# Patient Record
Sex: FEMALE
[De-identification: names, ages, dates, MRNs, and addresses within clinical notes are randomized per-mention and may not be internally consistent; named-entity substitution may affect disease eponyms.]

---

## 2018-01-08 ENCOUNTER — RESULT REVIEW (OUTPATIENT)
Age: 38
End: 2018-01-08

## 2022-10-19 PROBLEM — Z00.00 ENCOUNTER FOR PREVENTIVE HEALTH EXAMINATION: Status: ACTIVE | Noted: 2022-10-19

## 2022-10-26 ENCOUNTER — APPOINTMENT (OUTPATIENT)
Dept: PULMONOLOGY | Facility: CLINIC | Age: 42
End: 2022-10-26

## 2022-10-26 VITALS
WEIGHT: 193 LBS | OXYGEN SATURATION: 98 % | TEMPERATURE: 97.7 F | SYSTOLIC BLOOD PRESSURE: 122 MMHG | RESPIRATION RATE: 13 BRPM | BODY MASS INDEX: 31.02 KG/M2 | HEIGHT: 66 IN | HEART RATE: 82 BPM | DIASTOLIC BLOOD PRESSURE: 69 MMHG

## 2022-10-26 PROCEDURE — 94726 PLETHYSMOGRAPHY LUNG VOLUMES: CPT

## 2022-10-26 PROCEDURE — 99203 OFFICE O/P NEW LOW 30 MIN: CPT | Mod: 25

## 2022-10-26 PROCEDURE — 94060 EVALUATION OF WHEEZING: CPT

## 2022-10-26 RX ORDER — FLUTICASONE FUROATE, UMECLIDINIUM BROMIDE AND VILANTEROL TRIFENATATE 100; 62.5; 25 UG/1; UG/1; UG/1
100-62.5-25 POWDER RESPIRATORY (INHALATION)
Qty: 1 | Refills: 0 | Status: ACTIVE | COMMUNITY
Start: 2022-10-26 | End: 1900-01-01

## 2022-10-26 NOTE — ASSESSMENT
[FreeTextEntry1] : In summary the patient is a 41-year-old female who has been exposed to mold and who presents for pulmonary consult.  Her physical exam is within normal limits.\par \par She underwent a pulmonary function test which revealed mild obstructive airways disease.\par \par She was given Trelegy and is instructed to follow-up in 1 month

## 2022-10-26 NOTE — HISTORY OF PRESENT ILLNESS
[Never] : never [TextBox_4] : Patient is a 41 year old female with no significant medical history presents for pulmonary evaluation \par \par  She reports that 9/29/2022 she got really ill at work symptoms included vomiting, shortness of breath, cough, headaches and dizziness. She states about 1 month prior to her getting ill the basement at work was flooded and was not cleaned properly. After becoming ill  she noticed her symptoms were exacerbated at work and that's when it was discovery that there was mold at the job. Currently she endorses having persistent cough, associated with chest tightness, headaches and dizziness. Denies any fevers, chill, chest pain or hemoptysis

## 2022-10-26 NOTE — REVIEW OF SYSTEMS
[Cough] : cough [Chest Tightness] : chest tightness [Dyspnea] : dyspnea [Headache] : headache [Dizziness] : dizziness [Negative] : Endocrine

## 2022-12-05 ENCOUNTER — APPOINTMENT (OUTPATIENT)
Dept: PULMONOLOGY | Facility: CLINIC | Age: 42
End: 2022-12-05

## 2022-12-05 VITALS
OXYGEN SATURATION: 98 % | RESPIRATION RATE: 12 BRPM | HEIGHT: 66 IN | BODY MASS INDEX: 31.18 KG/M2 | WEIGHT: 194 LBS | TEMPERATURE: 97.3 F | HEART RATE: 76 BPM | SYSTOLIC BLOOD PRESSURE: 121 MMHG | DIASTOLIC BLOOD PRESSURE: 80 MMHG

## 2022-12-05 DIAGNOSIS — Z87.09 PERSONAL HISTORY OF OTHER DISEASES OF THE RESPIRATORY SYSTEM: ICD-10-CM

## 2022-12-05 DIAGNOSIS — R05.9 COUGH, UNSPECIFIED: ICD-10-CM

## 2022-12-05 DIAGNOSIS — Z78.9 OTHER SPECIFIED HEALTH STATUS: ICD-10-CM

## 2022-12-05 PROCEDURE — 99213 OFFICE O/P EST LOW 20 MIN: CPT

## 2022-12-05 NOTE — HISTORY OF PRESENT ILLNESS
[Never] : never [TextBox_4] : Patient is a 41 year old female with no significant medical history presents for follow-up \par \par \par Patient reports that her symptoms are much improved since starting Trelegy. Denies any fevers, chills, shortness of breath, chest pain pain, or hemoptysis

## 2022-12-05 NOTE — ASSESSMENT
[FreeTextEntry1] : In summary the patient is a 42-year-old female with past medical history significant for mold exposure who presents for follow-up.  Patient's cough shortness of breath have improved.  I have instructed her to continue her current medications and follow-up in 3 months